# Patient Record
Sex: MALE | Race: OTHER
[De-identification: names, ages, dates, MRNs, and addresses within clinical notes are randomized per-mention and may not be internally consistent; named-entity substitution may affect disease eponyms.]

---

## 2019-04-13 ENCOUNTER — HOSPITAL ENCOUNTER (EMERGENCY)
Dept: HOSPITAL 80 - FED | Age: 34
Discharge: HOME | End: 2019-04-13
Payer: COMMERCIAL

## 2019-04-13 VITALS — DIASTOLIC BLOOD PRESSURE: 92 MMHG | SYSTOLIC BLOOD PRESSURE: 128 MMHG
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
## 2019-04-13 DIAGNOSIS — S93.432A: Primary | ICD-10-CM
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
## 2019-04-13 PROCEDURE — L4386 NON-PNEUM WALK BOOT PRE CST: HCPCS

## 2019-04-13 NOTE — EDPHY
H & P


Stated Complaint: skiing, twisted left ankle, pain


Time Seen by Provider: 04/13/19 15:25


Source: Patient


Exam Limitations: No limitations





- Personal History


Current Tetanus/Diphtheria Vaccine: Unsure


Current Tetanus Diphtheria and Acellular Pertussis (TDAP): Unsure





- Medical/Surgical History


Hx Asthma: No


Hx Chronic Respiratory Disease: No


Hx Diabetes: No


Hx Cardiac Disease: No


Hx Renal Disease: No


Hx Cirrhosis: No


Hx Alcoholism: No


Hx HIV/AIDS: No


Hx Splenectomy or Spleen Trauma: No


Other PMH: fx R ankle, L elbow, R shoulder, bilat knee MCL, R clavicle





- Social History


Smoking Status: Former smoker


Constitutional: 


 Initial Vital Signs











Temperature (C)  36.7 C   04/13/19 15:14


 


Heart Rate  78   04/13/19 15:14


 


Respiratory Rate  16   04/13/19 15:14


 


Blood Pressure  128/92 H  04/13/19 15:14


 


O2 Sat (%)  98   04/13/19 15:14








 











O2 Delivery Mode               Room Air














Allergies/Adverse Reactions: 


 





No Known Allergies Allergy (Verified 04/13/19 15:20)


 








Home Medications: 














 Medication  Instructions  Recorded


 


NK [No Known Home Meds]  04/13/19














Medical Decision Making





- Diagnostics


Imaging Results: 


 Imaging Impressions





Ankle X-Ray  04/13/19 15:21


Impression: No acute osseous findings.  


 


 











Procedures: 


Procedure:  Splint placement.





A left Smyth boot was applied.  After application of the splint I returned 

and re-examined the patient.  The splint was adequately immobilizing the joint 

and distal to the splint the patient's circulation and sensation was intact.





ED Course/Re-evaluation: 


Vital signs reviewed and stable upon arrival.


My read: There is a small bony irregularity at the distal fibula were patient 

is tender that could represent a possible hairline nondisplaced fracture


Radiology read shows no acute fracture.


Placed in walking boot, crutches, toe-touch weight-bearing status


Orthopedic follow-up for repeat imaging





No signs of neurovascular compromise/tenting of skin/compartment syndrome/

extremities and joints examined above and below area of concern and are 

neurovascularly intact. 





This patient was seen under the supervision of my secondary supervising 

physician.  I evaluated care for this patient with attending.  





Differential Diagnosis: 


Ankle injury differential diagnosis includes but is not limited to tibia 

fracture, fibula fracture, metatarsal fracture, LisFranc fracture, achilles 

tendon rupture, sprain.





Departure





- Departure


Disposition: Home, Routine, Self-Care


Clinical Impression: 


 Suspected fracture of bone





Left ankle sprain


Qualifiers:


 Encounter type: initial encounter Involved ligament of ankle: tibiofibular 

ligament Qualified Code(s): S93.432A - Sprain of tibiofibular ligament of left 

ankle, initial encounter





Condition: Good


Instructions:  Ankle Sprain (ED), Suspected Fracture (ED)


Additional Instructions: 


Elevate right lower extremity to decrease swelling.


Wear walking boot while out of bed until pain free or seen by Orthopedics.


Use crutches to aid ambulation.  Start with toe-touch weight-bearing status.


Take Tylenol 650 mg every 4 hours and/or Ibuprofen 600 mg every 8 hours with 

food as needed for pain. 


Apply ice for 30 minutes at a time; 2-3 times per day for the next 1-2 days. 


Follow up with Orthopedics in 7-10 days at which time they will evaluate and 

recommend with you if conservative management versus further imaging is 

indicated. 





The x-rays obtained in the emergency department today demonstrate suspected 

hairline nondisplaced fracture distal fibula.  Sometimes fractures are not 

obvious on the initial set of x-rays performed in the ED.  For this reason, you 

should have repeat x-rays performed in 7-10 days if you are having any pain 

exclude the possibility of an occult fracture.


Referrals: 


Matt Urena MD [Medical Doctor] - As per Instructions